# Patient Record
Sex: FEMALE | Race: WHITE
[De-identification: names, ages, dates, MRNs, and addresses within clinical notes are randomized per-mention and may not be internally consistent; named-entity substitution may affect disease eponyms.]

---

## 2020-01-27 ENCOUNTER — HOSPITAL ENCOUNTER (OUTPATIENT)
Dept: HOSPITAL 35 - OR | Age: 35
Discharge: HOME | End: 2020-01-27
Attending: OTOLARYNGOLOGY
Payer: COMMERCIAL

## 2020-01-27 VITALS — SYSTOLIC BLOOD PRESSURE: 129 MMHG | DIASTOLIC BLOOD PRESSURE: 67 MMHG

## 2020-01-27 VITALS — HEIGHT: 65 IN | WEIGHT: 189.8 LBS | BODY MASS INDEX: 31.62 KG/M2

## 2020-01-27 DIAGNOSIS — Z88.8: ICD-10-CM

## 2020-01-27 DIAGNOSIS — J35.3: ICD-10-CM

## 2020-01-27 DIAGNOSIS — J35.01: Primary | ICD-10-CM

## 2020-01-27 DIAGNOSIS — Z98.890: ICD-10-CM

## 2020-01-27 DIAGNOSIS — J32.9: ICD-10-CM

## 2020-01-27 PROCEDURE — 50101: CPT

## 2020-01-27 PROCEDURE — 62110: CPT

## 2020-01-27 PROCEDURE — 50010 RENAL EXPLORATION: CPT

## 2020-01-27 PROCEDURE — 62900: CPT

## 2020-01-27 PROCEDURE — 70005: CPT

## 2020-01-27 NOTE — H
[facility]
[address]
[place]   [address]                     HISTORY AND PHYSICAL          
_______________________________________________________________________________
 
Name:       REBEKAH ROBERTS         Room #:         150-4       [facility]..#:      [id number]                       Account #:      [id number]  
Admission:  01/27/20    Attend Phys:    Bucky Rangel MD  
Discharge:              Date of Birth:  [birth year]  
                                                          Report #: [id number]
                                                                    [id number]   
_______________________________________________________________________________
THIS REPORT FOR:   //name//                          
 
CC: Hortensia Rangel
 
PREOPERATIVE HISTORY AND PHYSICAL
 
Her procedure is scheduled for 01/27/2020.
 
HISTORY OF PRESENT ILLNESS:  The patient is having problems with her throat. 
She has persistent swelling of her tonsils with white material that comes from
her tonsils in the form of tonsil stones.  She has persistent upper cervical
adenopathy with discomfort.  She has been getting a lot of pharyngitis and
sinusitis.  She does not breathe very well through her nose.  She has had
problems with her tonsils since she was a child.
 
PAST MEDICAL HISTORY:  Otherwise, not significant.
 
MEDICATIONS:  She is on no medications on a regular basis.
 
ALLERGIES:  SHE IS ALLERGIC TO TRAMADOL.
 
PHYSICAL EXAMINATION:  She had allergic swelling of the nasal tissues.  She had
3+ enlarged tonsils with deep crypts and nasal endoscopy shows an enlarged
adenoid especially at the superior aspect of the nasopharynx.
 
IMPRESSION:  Tonsil and adenoid hypertrophy with chronic tonsillitis and chronic
sinusitis.
 
PLAN:  Tonsillectomy and adenoidectomy.
 
 
 
 
 
 
 
 
 
 
 
 
 
 
  <ELECTRONICALLY SIGNED>
   By: Bucky Rangel MD          
  01/27/20     0844
D: 01/24/20 1516                           _____________________________________
T: 01/24/20 1535                           Bucky Rangel MD            /nt

## 2020-01-27 NOTE — O
[facility]
[address]
[place]   [address]                     OPERATIVE REPORT              
_______________________________________________________________________________
 
Name:       JAMES WALLACEREBEKAH         Room #:         150-4       [facility] 
M..#:      [id number]                       Account #:      [id number]  
Admission:  01/27/20    Attend Phys:    Bucky Rangel MD  
Discharge:              Date of Birth:  [birth year]  
                                                          Report #: [id number]
                                                                    [id number]   
_______________________________________________________________________________
THIS REPORT FOR:   //name//                          
 
CC: Hortensia Rangel
 
DATE OF SERVICE:  01/27/2020
 
 
PREOPERATIVE DIAGNOSES:  Chronic tonsillitis with tonsil and adenoid
hypertrophy.
 
POSTOPERATIVE DIAGNOSES:  Chronic tonsillitis with tonsil and adenoid
hypertrophy.
 
OPERATIVE PROCEDURES:  Tonsillectomy and adenoidectomy.
 
ANESTHESIA:  General endotracheal.
 
DESCRIPTION OF PROCEDURE:  The patient was taken to the operating room and
placed in supine position.  General anesthesia was induced by endotracheal
intubation.  Once adequate general anesthesia was obtained, the patient was
draped in a sterile manner.  A Anahi-Camron mouth gag was placed into the
patient's mouth and tongue was deviated upwards.  A throat pack was placed.  Red
rubber catheters were placed through the nose and nasopharynx and out the
oropharynx and oral cavity to elevate the soft palate and the nasopharynx was
visualized indirectly using a mirror.  The adenoid was hypertrophied and
adenoidectomy was performed by placing the adenoid curette at the base of the
vomer and sweeping downward.  The adenoid was removed and sent to pathology. 
Nasopharyngeal packing was placed.  The right tonsil was grasped and deviated
towards midline.  An incision was placed in the anterior tonsillar pillar using
the Bovie electrocautery and a plane between tonsillar capsule and tonsillar
fossa was established.  Dissection was carried out in this plane using the Bovie
and hemostasis was achieved during the dissection.  Dissection was carried out
from superior to inferior.  The inferior pole was incised and posterior
tonsillar mucosa was incised.  The tonsil was removed and sent to pathology. 
The left tonsil was removed in exactly the same manner.  The area was then
irrigated with normal saline.  Hemostasis was verified in the tonsillar beds. 
The nasopharyngeal packing was removed.  The nasopharynx was irrigated and there
was adequate hemostasis in the nasopharynx as well.  The throat pack, mouth gag
and red rubber catheters were all removed.  The patient tolerated the procedure
well.  Blood loss was approximately 10 mL.  The patient was then awoken and
taken to the recovery room in stable condition for postoperative monitoring.
 
 
                         
   By:                               
                   
D: 01/27/20 1032                           _____________________________________
T: 01/27/20 1042                           Bucky Rangel MD            /nt

## 2020-01-28 NOTE — PATH
[facility]
[address]
[place]   [address]                     PATHOLOGY RPT PROCEDURE       
_______________________________________________________________________________
 
Name:       REBEKAH ROBERTS         Room #:                     DEP [facility] 
M.R.#:      [id number]     Account #:      [id number]  
Admission:  01/27/20    Date of Birth:  [birth year]  
Discharge:  01/27/20                                    Report #:    [id number]
                                                        Path Case #: [id number]
_______________________________________________________________________________
 
[facility] Accession Number: [id number]
.                                                                01
Material submitted:                                        .
PART A: tonsil - RIGHT TONSIL AND ADENOID. Modifiers: right
PART B: tonsil - LEFT TONSIL. Modifiers: left
.                                                                01
Clinical history:                                          .
Chronic tonsillitis, hypertrophy of tonsils and adenoids
.                                                                02
**********************************************************************
Diagnosis:
A. Right tonsil and adenoid, right tonsillectomy and adenoidectomy:
- Acutely inflamed epithelium overlying lymphoid tissue with reactive
hyperplasia.
.
B. Left tonsil, tonsillectomy:
- Acutely inflamed epithelium overlying lymphoid tissue with reactive
hyperplasia.
(IUV/db; 1/28/2020)
LBQ  01/28/2020  1434 Local
**********************************************************************
.                                                                02
Electronically signed:                                     .
Brenda Reddy MD, Pathologist
NPI- [id number]
.                                                                01
Gross description:                                         .
A.  The specimen is received in formalin labeled "Rebekah Roberts,
right tonsil and adenoid" and consists of a pink-tan tonsil weighing 4 g
and measuring 3.2 x 2.2 x 1.1 cm.  Sectioning reveals no gross lesions.
Also received are 2 segments of friable pink-tan tissue compatible with
tonsils measuring 1.6 x 1.0 cm and representative sections are submitted
in A1.
.
B. The specimen is received in formalin labeled "Rebekah Roberts,
left tonsil" and consists of a pink-tan tonsil weighing 5 g and measuring
3.4 x 2.1 x 1.4 cm.  Sectioning reveals focal crypts filled with
yellow-tan material and representative sections are submitted in B1.
(SDY; 1/27/2020)
SYU/SYU  01/27/2020  1705 Local
.                                                                02
Pathologist provided ICD-10:
J35.3
.                                                                02
CPT                                                        .
254058, 928054
Specimen Comment: A courtesy copy of this report has been sent to [phone],
 
 
[place] [address]                     PATHOLOGY RPT PROCEDURE       
_______________________________________________________________________________
 
Name:       REBEKAH ROBERTS         Room #:                     DEP [facility] 
M.R.#:      [id number]     Account #:      [id number]  
Admission:  01/27/20    Date of Birth:  [birth year]  
Discharge:  01/27/20                                    Report #:    [id number]
                                                        Path Case #: [id number]
_______________________________________________________________________________
[phone]
Specimen Comment: [id number]
Specimen Comment: Report sent to  / DR BADILLO
***Performed at:  01
   [address], [place]  [id number]
   MD Ankit Sandoval MD Phone:  [phone]
***Performed at:  02
   [address]  [id number]
   MD Brenda Reddy MD Phone:  [phone]